# Patient Record
Sex: MALE | Race: BLACK OR AFRICAN AMERICAN | NOT HISPANIC OR LATINO | Employment: FULL TIME | URBAN - METROPOLITAN AREA
[De-identification: names, ages, dates, MRNs, and addresses within clinical notes are randomized per-mention and may not be internally consistent; named-entity substitution may affect disease eponyms.]

---

## 2017-05-24 ENCOUNTER — COMMUNICATION - HEALTHEAST (OUTPATIENT)
Dept: TELEHEALTH | Facility: CLINIC | Age: 32
End: 2017-05-24

## 2017-05-24 ENCOUNTER — COMMUNICATION - HEALTHEAST (OUTPATIENT)
Dept: FAMILY MEDICINE | Facility: CLINIC | Age: 32
End: 2017-05-24

## 2017-05-24 ENCOUNTER — OFFICE VISIT - HEALTHEAST (OUTPATIENT)
Dept: FAMILY MEDICINE | Facility: CLINIC | Age: 32
End: 2017-05-24

## 2017-05-24 DIAGNOSIS — E66.811 OBESITY (BMI 30.0-34.9): ICD-10-CM

## 2017-05-24 DIAGNOSIS — R73.9 HYPERGLYCEMIA: ICD-10-CM

## 2017-05-24 DIAGNOSIS — Z00.00 ROUTINE GENERAL MEDICAL EXAMINATION AT A HEALTH CARE FACILITY: ICD-10-CM

## 2017-05-24 LAB
CHOLEST SERPL-MCNC: 143 MG/DL
FASTING STATUS PATIENT QL REPORTED: YES
HBA1C MFR BLD: 5.8 % (ref 3.5–6)
HDLC SERPL-MCNC: 48 MG/DL
LDLC SERPL CALC-MCNC: 79 MG/DL
TRIGL SERPL-MCNC: 81 MG/DL

## 2017-05-24 ASSESSMENT — MIFFLIN-ST. JEOR: SCORE: 1943.24

## 2018-04-09 ENCOUNTER — OFFICE VISIT - HEALTHEAST (OUTPATIENT)
Dept: FAMILY MEDICINE | Facility: CLINIC | Age: 33
End: 2018-04-09

## 2018-04-09 DIAGNOSIS — R50.9 FEVER: ICD-10-CM

## 2018-04-09 DIAGNOSIS — J02.9 SORETHROAT: ICD-10-CM

## 2018-04-09 LAB
DEPRECATED S PYO AG THROAT QL EIA: NORMAL
FLUAV AG SPEC QL IA: NORMAL
FLUBV AG SPEC QL IA: NORMAL

## 2018-04-09 ASSESSMENT — MIFFLIN-ST. JEOR: SCORE: 1934.17

## 2018-04-10 LAB — GROUP A STREP BY PCR: NORMAL

## 2018-11-26 ENCOUNTER — OFFICE VISIT - HEALTHEAST (OUTPATIENT)
Dept: FAMILY MEDICINE | Facility: CLINIC | Age: 33
End: 2018-11-26

## 2018-11-26 DIAGNOSIS — E66.01 MORBID OBESITY (H): ICD-10-CM

## 2018-11-26 DIAGNOSIS — Z00.00 ROUTINE GENERAL MEDICAL EXAMINATION AT A HEALTH CARE FACILITY: ICD-10-CM

## 2018-11-26 DIAGNOSIS — R73.03 PREDIABETES: ICD-10-CM

## 2018-11-26 LAB
CHOLEST SERPL-MCNC: 147 MG/DL
FASTING STATUS PATIENT QL REPORTED: YES
HBA1C MFR BLD: 5.8 % (ref 3.5–6.1)
HDLC SERPL-MCNC: 54 MG/DL
LDLC SERPL CALC-MCNC: 78 MG/DL
TRIGL SERPL-MCNC: 74 MG/DL

## 2018-11-26 ASSESSMENT — MIFFLIN-ST. JEOR: SCORE: 1961.38

## 2019-12-10 ENCOUNTER — COMMUNICATION - HEALTHEAST (OUTPATIENT)
Dept: SCHEDULING | Facility: CLINIC | Age: 34
End: 2019-12-10

## 2019-12-18 ENCOUNTER — OFFICE VISIT - HEALTHEAST (OUTPATIENT)
Dept: FAMILY MEDICINE | Facility: CLINIC | Age: 34
End: 2019-12-18

## 2019-12-18 DIAGNOSIS — Z00.00 ROUTINE GENERAL MEDICAL EXAMINATION AT A HEALTH CARE FACILITY: ICD-10-CM

## 2019-12-18 DIAGNOSIS — E66.01 MORBID OBESITY (H): ICD-10-CM

## 2019-12-18 LAB
CHOLEST SERPL-MCNC: 142 MG/DL
FASTING STATUS PATIENT QL REPORTED: YES
HBA1C MFR BLD: 5.5 % (ref 3.5–6)
HDLC SERPL-MCNC: 51 MG/DL
LDLC SERPL CALC-MCNC: 81 MG/DL
TRIGL SERPL-MCNC: 51 MG/DL

## 2019-12-18 ASSESSMENT — MIFFLIN-ST. JEOR: SCORE: 1906.95

## 2021-05-31 VITALS — HEIGHT: 68 IN | WEIGHT: 229 LBS | BODY MASS INDEX: 34.71 KG/M2

## 2021-06-01 VITALS — WEIGHT: 227 LBS | HEIGHT: 68 IN | BODY MASS INDEX: 34.4 KG/M2

## 2021-06-02 VITALS — HEIGHT: 68 IN | BODY MASS INDEX: 35.31 KG/M2 | WEIGHT: 233 LBS

## 2021-06-04 VITALS
BODY MASS INDEX: 33.49 KG/M2 | DIASTOLIC BLOOD PRESSURE: 62 MMHG | HEART RATE: 60 BPM | WEIGHT: 221 LBS | HEIGHT: 68 IN | SYSTOLIC BLOOD PRESSURE: 110 MMHG

## 2021-06-04 NOTE — TELEPHONE ENCOUNTER
Who is calling:  Patient   Reason for Call:  Patient states he will be running about 20 min late for appointment. He's wondering whether or not he can still be seen or if it would be easiest to reschedule. Please call as soon as possible.    Okay to leave a detailed message: Yes

## 2021-06-04 NOTE — PROGRESS NOTES
MALE PREVENTATIVE EXAM    Assessment and Plan:     Routine general medical examination at a health care facility  -     Lipid Cascade  -     Glycosylated Hemoglobin A1c  We discussed healthy lifestyle, nutrition, cardiovascular risk reduction, self care, safety, sunscreen, seatbelt, and timing of cancer screening.  Health maintenance screening and immunizations reviewed with the patient.    BMI 33.0-33.9,adult  Counseled healthy lifestyle modifications     Other orders  -     Influenza,Seasonal,Quad,INJ =/>6months    Next follow up:  Return in about 1 year (around 12/18/2020).    Immunization Review  Adult Imm Review: Due today, orders placed    I discussed the following with the patient:   Adult Healthy Living: Importance of regular exercise  Healthy nutrition  Getting adequate sleep  Stress management     Subjective:   Chief Complaint: Audra Starr is an 34 y.o. male here for a preventative health visit.     HPI:    He has been exercising regularly.     Health Maintenance: He is fasting today. He is due for an influenza vaccination today.     Review of Systems: Please see above. The rest of the review of systems are negative for all systems.     PSFH:  He is from [facility]. He was back in September to visit family and for his mom's one year remembrance. He has two girls.     Healthy Habits  Are you taking a daily aspirin? No  Do you typically exercising at least 40 min, 3-4 times per week?  Yes  Do you usually eat at least 4 servings of fruit and vegetables a day, include whole grains and fiber and avoid regularly eating high fat foods? NO  Have you had an eye exam in the past two years? NO  Do you see a dentist twice per year? Yes  Do you have any concerns regarding sleep? No    Safety Screen  If you own firearms, are they secured in a locked gun cabinet or with trigger locks? The patient does not own any firearms  No data recorded    Cancer Screening     Patient has no health maintenance due at this time     "    Patient Care Team:  Dino Polo MD as PCP - General (Family Medicine)  Dino Polo MD as Assigned PCP    History     Reviewed By Date/Time Sections Reviewed    Norah BonillaBhumi 12/18/2019  9:08 AM Socioeconomic, Lifestyle    Chad Bhumi Almazan 12/18/2019  9:07 AM Tobacco, Alcohol, Drug Use, Family    Chad Bhumi Almazan 12/18/2019  9:06 AM Medical, Surgical        Objective:   Vital Signs:   Visit Vitals  /62 (Patient Site: Left Arm, Patient Position: Sitting, Cuff Size: Adult Regular)   Pulse 60   Ht 5' 8\" (1.727 m)   Wt 221 lb (100.2 kg)   BMI 33.60 kg/m         PHYSICAL EXAM  Constitutional: Patient is oriented to person, place, and time. Patient appears well-developed and well-nourished. No distress.   Head: Normocephalic and atraumatic.   Right Ear: External ear normal.   Left Ear: External ear normal.   Nose: Nose normal.   Mouth/Throat: Oropharynx is clear and moist. No oropharyngeal exudate.   Eyes: Conjunctivae and EOM are normal. Pupils are equal, round, and reactive to light. Right eye exhibits no discharge. Left eye exhibits no discharge. No scleral icterus.   Neck: Neck supple. No JVD present. No tracheal deviation present. No thyromegaly present.   Cardiovascular: Normal rate, regular rhythm, normal heart sounds and intact distal pulses.   No murmur heard.   Pulmonary/Chest: Effort normal and breath sounds normal. No stridor. No respiratory distress. Patient has no wheezes, no rales, exhibits no tenderness.   Abdominal: Soft. Bowel sounds are normal. Patient exhibits no distension and no mass.  There is no tenderness. There is no rebound and no guarding.  No inguinal hernia on valsalva maneuver  Lymphadenopathy:  Patient has no cervical adenopathy.   Neurological: Patient is alert and oriented to person, place, and time. Patient has normal reflexes. No cranial nerve deficit. Coordination normal.   Skin: Skin is warm and dry. No rash " noted. Patient is not diaphoretic. No erythema. No pallor.   Psychiatric: Patient has good eye contact without any psychomotor retardation or stereotypic behaviors.  normal mood and affect. Judgment and thought content normal.   Speech is regular rate and rhythm.     ADDITIONAL HISTORY SUMMARIZED (2): None.  DECISION TO OBTAIN EXTRA INFORMATION (1): None.   RADIOLOGY TESTS (1): None.  LABS (1): Reviewed labs from 11/26/18 and ordered labs today.   MEDICINE TESTS (1): None.  INDEPENDENT REVIEW (2 each): None.     The visit lasted a total of 7 minutes face to face with the patient. Over 50% of the time was spent counseling and educating the patient about overall wellness.    INorah, am scribing for and in the presence of, Dr. Holland.    I, Dr. Holland, personally performed the services described in this documentation, as scribed by Norah Bonilla in my presence, and it is both accurate and complete.    Total data points: 1       Medication List      as of December 18, 2019  9:34 AM     You have not been prescribed any medications.         Additional Screenings Completed Today:

## 2021-06-10 NOTE — PROGRESS NOTES
Hm   No issues  hosp surg neg  Fmh: none known  Med neg  Allergies neg  hab neg 1-2 per month.    .  Has one year old.   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  Tries to exercise once in a while.    Hx ? Fasting glucose 114 and did not follow up with A1c    Has green card.  From [facility].  Likely first mmr in [place].    ROS:  Constitutional: denies fever  Vision: denies change in vision  ENT: denies cough  Resp: denies shortness of breath  Card: denies palpitations  GI: denies vomiting or abnormal stool, melena, hematochezia  : denies dysuria  Neuro: denies numbness or weakness  Derm: denies rash  Joints: denies redness or swelling  Endo: denies polyuria  Mental health: mood is good  Extremities: no edema  Otherwise negative review of systems     ROS: as noted above    OBJECTIVE:   Vitals:    05/24/17 0758   BP: 104/70   Pulse: 60   Resp: 16   Temp: 98.6  F (37  C)      Head: atraumatic   Eyes: nl eom, anicteric   Ears: nl external ears   Neck: nl nodes, supple   Lungs: clear to ausc   Heart: regular rhythm  Back: no tenderness  Abd: soft nontender   Joints: uninflamed   Ext: nontender calves   Mental: euthymic  Neuro: no weakness  Gait: normal  bmi 34      ASSESSMENT/PLAN:   Health Maintenance/ Plan: anticipatory guidance, discussion of risk factors, lifestyle modification, and risk factor management. For children age 12 months to adulthood verbal dental referral is given and discussed benefits and risks of FVA and obtained verbal with each application.      Additional diagnoses and related orders:  1. Obesity (BMI 30.0-34.9)  Comprehensive Metabolic Panel   2. Hyperglycemia  Glycosylated Hemoglobin A1c   3. Routine general medical examination at a health care facility  Lipid Cascade     Life style modification for obesity and diabetes mellitus avoidance discussed as priority  follow up yearly or per labs.

## 2021-06-16 PROBLEM — E66.01 MORBID OBESITY (H): Status: ACTIVE | Noted: 2019-12-18

## 2021-06-17 NOTE — PROGRESS NOTES
Assessment/Plan:     1. Sorethroat  2. Fever  Most likely viral in nature.  We reviewed symptomatic treatments with over-the-counter analgesics.  If fever persist beyond the next 2-3 days, symptoms worsen, or onset of additional concerns, they will notify us for further evaluation.  - Rapid Strep A Screen-Throat  - Group A Strep, RNA Direct Detection, Throat  - Influenza A/B Rapid Test        Subjective:      Audra Starr is a 32 y.o. male presenting to clinic today for evaluation of fever and sore throat, sudden onset and acute 2 days ago, worsening.  Fever as high as 102, last fever yesterday evening at 100 , treating with Tylenol or ibuprofen with some improvement.  Headache occurs when fevers present, disappears when fever resolves.  Denies body aches, cough, nasal congestion, nausea, vomiting, diarrhea, or rashes.  He is , 23-month-old daughter at home, both are healthy.  No known exposures.    No current outpatient prescriptions on file.     No current facility-administered medications for this visit.        Past Medical History, Family History, and Social History reviewed.  No past medical history on file.  No past surgical history on file.  Review of patient's allergies indicates no known allergies.  No family history on file.  Social History     Social History     Marital status:      Spouse name: N/A     Number of children: N/A     Years of education: N/A     Occupational History     Not on file.     Social History Main Topics     Smoking status: Never Smoker     Smokeless tobacco: Never Used     Alcohol use Not on file     Drug use: Not on file     Sexual activity: Not on file     Other Topics Concern     Not on file     Social History Narrative         Review of systems is as stated in HPI, and the remainder of the 10 system review is otherwise negative.    Objective:     Vitals:    04/09/18 0730   BP: 110/74   Patient Site: Left Arm   Patient Position: Sitting   Cuff Size: Adult  "Large   Pulse: (!) 102   Resp: 20   Temp: 98.1  F (36.7  C)   TempSrc: Oral   SpO2: 98%   Weight: (!) 227 lb (103 kg)   Height: 5' 8\" (1.727 m)    Body mass index is 34.52 kg/(m^2).    Alert male.  Pupils are equal, round, reactive to light.  Tympanic membrane spelling translucent.  Oropharynx is with erythematous and edematous tonsils and mild erythema as well as this posterior soft palate.  Neck supple with shotty anterior cervical lymphadenopathy.  Heart with regular rate and rhythm, mild tachycardia.  Lungs clear.  Extremities without edema or rashes.    Office Visit on 04/09/2018   Component Date Value Ref Range Status     Rapid Strep A Antigen 04/09/2018 No Group A Strep detected, presumptive negative  No Group A Strep detected, presumptive negative Final     Influenza  A, Rapid Antigen 04/09/2018 No Influenza A antigen detected  No Influenza A antigen detected Final     Influenza B, Rapid Antigen 04/09/2018 No Influenza B antigen detected  No Influenza B antigen detected Final          This note has been dictated using voice recognition software. Any grammatical or context distortions are unintentional and inherent to the the software.   "

## 2021-06-21 NOTE — PROGRESS NOTES
MALE PREVENTATIVE EXAM    Assessment and Plan:     Routine general medical examination at a health care facility  -     Lipid Cascade    Morbid obesity (H)  Counseled on lifestyle modifications    Prediabetes  -     Glycosylated Hemoglobin A1c  Counseled on reduction of carbohydrate and sweet food    Other orders  -     Influenza, Seasonal,Quad Inj, 36+ MOS      Next follow up:  One year    Immunization Review  Adult Imm Review: No immunizations due today  Pt will recieve flu shot today    I discussed the following with the patient:   Adult Healthy Living: Importance of regular exercise  Healthy nutrition  Weight loss referral options  Stress management    Subjective:   Chief Complaint: Audra Starr is an 33 y.o. male accompanied by his wife here for a preventative health visit.     HPI:   Patient is not feeling down or depressed currently.  PHQ-2 Total Score: 0 (11/26/2018  3:00 PM)    Healthy Habits  Are you taking a daily aspirin? No  Do you typically exercising at least 40 min, 3-4 times per week?  NO  Do you usually eat at least 4 servings of fruit and vegetables a day, include whole grains and fiber and avoid regularly eating high fat foods? Yes  Have you had an eye exam in the past two years? NO  Do you see a dentist twice per year? NO  Do you have any concerns regarding sleep? No    Safety Screen  If you own firearms, are they secured in a locked gun cabinet or with trigger locks? The patient does not own any firearms  No Data Recorded    Review of Systems:  Please see above.  The rest of the review of systems are negative for all systems.     Cancer Screening     Patient has no health maintenance due at this time        Patient Care Team:  No Primary Care Provider as PCP - General    History     Reviewed By Date/Time Sections Reviewed    Lise Jimenez CMA 11/26/2018  3:36 PM Tobacco        Objective:   Vital Signs:   Visit Vitals  /68 (Patient Site: Left Arm, Patient Position: Sitting, Cuff  "Size: Adult Regular)   Pulse 72   Ht 5' 8\" (1.727 m)   Wt (!) 233 lb (105.7 kg)   BMI 35.43 kg/m         PHYSICAL EXAM    Objective:    Physical Exam   Vitals:    11/26/18 1536   BP: 112/68   Pulse: 72      Constitutional: Patient is oriented to person, place, and time. Patient appears well-developed and well-nourished. No distress.   Head: Normocephalic and atraumatic.   Right Ear: External ear normal. Normal TM  Left Ear: External ear normal. Normal TM   Nose: Nose normal.   Mouth/Throat: Oropharynx is clear and moist. No oropharyngeal exudate.   Eyes: Conjunctivae and EOM are normal. Pupils are equal, round, and reactive to light. Right eye exhibits no discharge. Left eye exhibits no discharge. No scleral icterus.   Neck: Neck supple. No JVD present. No tracheal deviation present. No thyromegaly present.   Cardiovascular: Normal rate, regular rhythm, normal heart sounds and intact distal pulses. No murmur heard.   Pulmonary/Chest: Effort normal and breath sounds normal. No stridor. No respiratory distress. Patient has no wheezes, no rales, exhibits no tenderness.   Abdominal: Soft. Bowel sounds are normal. Patient exhibits no distension and no mass. There is no tenderness. There is no rebound and no guarding.   Lymphadenopathy:  Patient has no cervical adenopathy.   Neurological: Patient is alert and oriented to person, place, and time. Patient has normal reflexes. No cranial nerve deficit. Coordination normal.   Skin: Skin is warm and dry. No rash noted. Patient is not diaphoretic. No erythema. No pallor.        Medication List      as of 11/26/2018  4:43 PM     You have not been prescribed any medications.         Additional Screenings Completed Today:     ADDITIONAL HISTORY SUMMARIZED (2): None.   DECISION TO OBTAIN EXTRA INFORMATION (1): None.   RADIOLOGY TESTS (1): None.  LABS (1): Labs reviewed from 5/24/2017. Labs ordered today.  MEDICINE TESTS (1): None.  INDEPENDENT REVIEW (2 each): None.     Total data " points = 1    Total time was 18 minutes, greater than 50% counseling and coordinating care regarding the above issues.    By signing my name below, I, Tess Dickson, attest that this documentation has been prepared under the direction and in the presence of Dr. Lorraine Holland.  Electronic Signature: Bhumi Pittman. 11/26/2018 15:55.    I, Dr. Dino Ross MD , personally performed the services described in this documentation. All medical record entries made by the scribe were at my direction and in my presence. I have reviewed the chart and discharge instructions (if applicable) and agree that the record reflects my personal performance and is accurate and complete.

## 2021-06-26 ENCOUNTER — HEALTH MAINTENANCE LETTER (OUTPATIENT)
Age: 36
End: 2021-06-26

## 2021-08-26 ENCOUNTER — OFFICE VISIT (OUTPATIENT)
Dept: FAMILY MEDICINE | Facility: CLINIC | Age: 36
End: 2021-08-26
Payer: COMMERCIAL

## 2021-08-26 VITALS
WEIGHT: 237.2 LBS | HEART RATE: 80 BPM | HEIGHT: 68 IN | SYSTOLIC BLOOD PRESSURE: 108 MMHG | DIASTOLIC BLOOD PRESSURE: 82 MMHG | BODY MASS INDEX: 35.95 KG/M2

## 2021-08-26 DIAGNOSIS — Z00.00 ROUTINE ADULT HEALTH MAINTENANCE: Primary | ICD-10-CM

## 2021-08-26 PROBLEM — E66.01 MORBID OBESITY (H): Status: RESOLVED | Noted: 2019-12-18 | Resolved: 2021-08-26

## 2021-08-26 LAB
CHOLEST SERPL-MCNC: 149 MG/DL
FASTING STATUS PATIENT QL REPORTED: YES
HBA1C MFR BLD: 5.7 % (ref 0–5.6)
HDLC SERPL-MCNC: 47 MG/DL
LDLC SERPL CALC-MCNC: 82 MG/DL
TRIGL SERPL-MCNC: 99 MG/DL

## 2021-08-26 PROCEDURE — 80061 LIPID PANEL: CPT | Performed by: FAMILY MEDICINE

## 2021-08-26 PROCEDURE — 99395 PREV VISIT EST AGE 18-39: CPT | Performed by: FAMILY MEDICINE

## 2021-08-26 PROCEDURE — 83036 HEMOGLOBIN GLYCOSYLATED A1C: CPT | Performed by: FAMILY MEDICINE

## 2021-08-26 PROCEDURE — 36415 COLL VENOUS BLD VENIPUNCTURE: CPT | Performed by: FAMILY MEDICINE

## 2021-08-26 ASSESSMENT — MIFFLIN-ST. JEOR: SCORE: 1982.18

## 2021-08-26 NOTE — PROGRESS NOTES
SUBJECTIVE:   CC: Audra Starr is an 35 year old male who presents for preventative health visit.       Patient has been advised of split billing requirements and indicates understanding: Yes  Healthy Habits:     Getting at least 3 servings of Calcium per day:  NO    Bi-annual eye exam:  NO    Dental care twice a year:  Yes    Sleep apnea or symptoms of sleep apnea:  None    Diet:  Regular (no restrictions)    Frequency of exercise:  1 day/week    Duration of exercise:  30-45 minutes    Taking medications regularly:  Yes    Medication side effects:  None    PHQ-2 Total Score: 0    Additional concerns today:  No      Today's PHQ-2 Score:   PHQ-2 ( 1999 [employer]) 8/26/2021   Q1: Little interest or pleasure in doing things 0   Q2: Feeling down, depressed or hopeless 0   PHQ-2 Score 0   Q1: Little interest or pleasure in doing things Not at all   Q2: Feeling down, depressed or hopeless Not at all   PHQ-2 Score 0       Abuse: Current or Past(Physical, Sexual or Emotional)- No  Do you feel safe in your environment? No    Social History     Tobacco Use     Smoking status: Never Smoker     Smokeless tobacco: Never Used   Substance Use Topics     Alcohol use: Yes         Alcohol Use 8/26/2021   Prescreen: >3 drinks/day or >7 drinks/week? No       Last PSA: No results found for: PSA    Reviewed orders with patient. Reviewed health maintenance and updated orders accordingly - Yes  Lab work is in process    Reviewed and updated as needed this visit by clinical staff  Tobacco  Allergies  Meds  Problems             Reviewed and updated as needed this visit by Provider    Meds  Problems            No past medical history on file.   No past surgical history on file.    Review of Systems  CONSTITUTIONAL: NEGATIVE for fever, chills, change in weight  INTEGUMENTARY/SKIN: NEGATIVE for worrisome rashes, moles or lesions  EYES: NEGATIVE for vision changes or irritation  ENT: NEGATIVE for ear, mouth and throat problems  RESP:  "NEGATIVE for significant cough or SOB  CV: NEGATIVE for chest pain, palpitations or peripheral edema  GI: NEGATIVE for nausea, abdominal pain, heartburn, or change in bowel habits   male: negative for dysuria, hematuria, decreased urinary stream, erectile dysfunction, urethral discharge  MUSCULOSKELETAL: NEGATIVE for significant arthralgias or myalgia  NEURO: NEGATIVE for weakness, dizziness or paresthesias  PSYCHIATRIC: NEGATIVE for changes in mood or affect    OBJECTIVE:   /82 (BP Location: Left arm, Patient Position: Sitting, Cuff Size: Adult Large)   Pulse 80   Ht 1.73 m (5' 8.11\")   Wt 107.6 kg (237 lb 3.2 oz)   BMI 35.95 kg/m      Physical Exam  GENERAL: healthy, alert and no distress  EYES: Eyes grossly normal to inspection, PERRL and conjunctivae and sclerae normal  HENT: ear canals and TM's normal, nose and mouth without ulcers or lesions  NECK: no adenopathy, no asymmetry, masses, or scars and thyroid normal to palpation  RESP: lungs clear to auscultation - no rales, rhonchi or wheezes  CV: regular rate and rhythm, normal S1 S2, no S3 or S4, no murmur, click or rub, no peripheral edema and peripheral pulses strong  ABDOMEN: soft, nontender, no hepatosplenomegaly, no masses and bowel sounds normal  MS: no gross musculoskeletal defects noted, no edema  SKIN: no suspicious lesions or rashes  NEURO: Normal strength and tone, mentation intact and speech normal  PSYCH: mentation appears normal, affect normal/bright    ASSESSMENT/PLAN:   Audra was seen today for physical.    Diagnoses and all orders for this visit:    Routine adult health maintenance  -     Hemoglobin A1c; Future  -     Lipid panel reflex to direct LDL Fasting; Future  We discussed healthy lifestyle, nutrition, cardiovascular risk reduction, self care, safety, sunscreen, and timing of cancer screening.  Health maintenance screening and immunizations reviewed with the patient.  Follow up yearly for the annual physical.     BMI " "35.0-35.9,adult  Counseled healthy lifestyle modifications      Patient has been advised of split billing requirements and indicates understanding: Yes  COUNSELING:   Reviewed preventive health counseling, as reflected in patient instructions    Estimated body mass index is 35.95 kg/m  as calculated from the following:    Height as of this encounter: 1.73 m (5' 8.11\").    Weight as of this encounter: 107.6 kg (237 lb 3.2 oz).     Weight management plan: Discussed healthy diet and exercise guidelines    He reports that he has never smoked. He has never used smokeless tobacco.      Counseling Resources:  ATP IV Guidelines  Pooled Cohorts Equation Calculator  FRAX Risk Assessment  ICSI Preventive Guidelines  Dietary Guidelines for Americans, 2010  USDA's MyPlate  ASA Prophylaxis  Lung CA Screening    Dino Ross MD  [facility]  "

## 2021-10-16 ENCOUNTER — HEALTH MAINTENANCE LETTER (OUTPATIENT)
Age: 36
End: 2021-10-16

## 2022-10-01 ENCOUNTER — HEALTH MAINTENANCE LETTER (OUTPATIENT)
Age: 37
End: 2022-10-01

## 2023-02-09 ASSESSMENT — ENCOUNTER SYMPTOMS
FEVER: 0
PARESTHESIAS: 0
WEAKNESS: 0
SHORTNESS OF BREATH: 0
ARTHRALGIAS: 0
DYSURIA: 0
PALPITATIONS: 0
HEMATOCHEZIA: 0
NAUSEA: 0
DIARRHEA: 0
MYALGIAS: 0
SORE THROAT: 0
EYE PAIN: 0
JOINT SWELLING: 0
ABDOMINAL PAIN: 0
CHILLS: 0
DIZZINESS: 0
HEADACHES: 0
CONSTIPATION: 0
FREQUENCY: 0
COUGH: 0
HEMATURIA: 0
NERVOUS/ANXIOUS: 0
HEARTBURN: 0

## 2023-02-10 ENCOUNTER — OFFICE VISIT (OUTPATIENT)
Dept: FAMILY MEDICINE | Facility: CLINIC | Age: 38
End: 2023-02-10
Payer: COMMERCIAL

## 2023-02-10 VITALS
HEIGHT: 68 IN | OXYGEN SATURATION: 98 % | DIASTOLIC BLOOD PRESSURE: 80 MMHG | TEMPERATURE: 97.5 F | SYSTOLIC BLOOD PRESSURE: 120 MMHG | BODY MASS INDEX: 37.8 KG/M2 | HEART RATE: 79 BPM | WEIGHT: 249.38 LBS

## 2023-02-10 DIAGNOSIS — R73.03 PREDIABETES: Primary | ICD-10-CM

## 2023-02-10 DIAGNOSIS — E66.09 CLASS 2 OBESITY DUE TO EXCESS CALORIES WITHOUT SERIOUS COMORBIDITY WITH BODY MASS INDEX (BMI) OF 37.0 TO 37.9 IN ADULT: ICD-10-CM

## 2023-02-10 DIAGNOSIS — E66.812 CLASS 2 OBESITY DUE TO EXCESS CALORIES WITHOUT SERIOUS COMORBIDITY WITH BODY MASS INDEX (BMI) OF 37.0 TO 37.9 IN ADULT: ICD-10-CM

## 2023-02-10 DIAGNOSIS — Z00.00 ANNUAL PHYSICAL EXAM: ICD-10-CM

## 2023-02-10 DIAGNOSIS — Z11.4 ENCOUNTER FOR SCREENING FOR HIV: ICD-10-CM

## 2023-02-10 DIAGNOSIS — Z11.59 ENCOUNTER FOR HEPATITIS C SCREENING TEST FOR LOW RISK PATIENT: ICD-10-CM

## 2023-02-10 LAB
ALBUMIN SERPL BCG-MCNC: 4.2 G/DL (ref 3.5–5.2)
ALP SERPL-CCNC: 65 U/L (ref 40–129)
ALT SERPL W P-5'-P-CCNC: 76 U/L (ref 10–50)
ANION GAP SERPL CALCULATED.3IONS-SCNC: 12 MMOL/L (ref 7–15)
AST SERPL W P-5'-P-CCNC: 55 U/L (ref 10–50)
BILIRUB SERPL-MCNC: 0.5 MG/DL
BUN SERPL-MCNC: 6.4 MG/DL (ref 6–20)
CALCIUM SERPL-MCNC: 9.7 MG/DL (ref 8.6–10)
CHLORIDE SERPL-SCNC: 103 MMOL/L (ref 98–107)
CHOLEST SERPL-MCNC: 150 MG/DL
CREAT SERPL-MCNC: 0.99 MG/DL (ref 0.67–1.17)
DEPRECATED HCO3 PLAS-SCNC: 25 MMOL/L (ref 22–29)
ERYTHROCYTE [DISTWIDTH] IN BLOOD BY AUTOMATED COUNT: 13.1 % (ref 10–15)
GFR SERPL CREATININE-BSD FRML MDRD: >90 ML/MIN/1.73M2
GLUCOSE SERPL-MCNC: 92 MG/DL (ref 70–99)
HBA1C MFR BLD: 6.1 % (ref 0–5.6)
HCT VFR BLD AUTO: 41.5 % (ref 40–53)
HDLC SERPL-MCNC: 46 MG/DL
HGB BLD-MCNC: 14.3 G/DL (ref 13.3–17.7)
LDLC SERPL CALC-MCNC: 82 MG/DL
MCH RBC QN AUTO: 28.7 PG (ref 26.5–33)
MCHC RBC AUTO-ENTMCNC: 34.5 G/DL (ref 31.5–36.5)
MCV RBC AUTO: 83 FL (ref 78–100)
NONHDLC SERPL-MCNC: 104 MG/DL
PLATELET # BLD AUTO: 234 10E3/UL (ref 150–450)
POTASSIUM SERPL-SCNC: 4.1 MMOL/L (ref 3.4–5.3)
PROT SERPL-MCNC: 7.8 G/DL (ref 6.4–8.3)
RBC # BLD AUTO: 4.99 10E6/UL (ref 4.4–5.9)
SODIUM SERPL-SCNC: 140 MMOL/L (ref 136–145)
TRIGL SERPL-MCNC: 108 MG/DL
WBC # BLD AUTO: 5.3 10E3/UL (ref 4–11)

## 2023-02-10 PROCEDURE — 85027 COMPLETE CBC AUTOMATED: CPT | Performed by: STUDENT IN AN ORGANIZED HEALTH CARE EDUCATION/TRAINING PROGRAM

## 2023-02-10 PROCEDURE — 83036 HEMOGLOBIN GLYCOSYLATED A1C: CPT | Performed by: STUDENT IN AN ORGANIZED HEALTH CARE EDUCATION/TRAINING PROGRAM

## 2023-02-10 PROCEDURE — 80061 LIPID PANEL: CPT | Performed by: STUDENT IN AN ORGANIZED HEALTH CARE EDUCATION/TRAINING PROGRAM

## 2023-02-10 PROCEDURE — 99213 OFFICE O/P EST LOW 20 MIN: CPT | Mod: 25 | Performed by: STUDENT IN AN ORGANIZED HEALTH CARE EDUCATION/TRAINING PROGRAM

## 2023-02-10 PROCEDURE — 36415 COLL VENOUS BLD VENIPUNCTURE: CPT | Performed by: STUDENT IN AN ORGANIZED HEALTH CARE EDUCATION/TRAINING PROGRAM

## 2023-02-10 PROCEDURE — 80053 COMPREHEN METABOLIC PANEL: CPT | Performed by: STUDENT IN AN ORGANIZED HEALTH CARE EDUCATION/TRAINING PROGRAM

## 2023-02-10 PROCEDURE — 91312 COVID-19 VACCINE BIVALENT BOOSTER 12+ (PFIZER): CPT | Performed by: STUDENT IN AN ORGANIZED HEALTH CARE EDUCATION/TRAINING PROGRAM

## 2023-02-10 PROCEDURE — 87389 HIV-1 AG W/HIV-1&-2 AB AG IA: CPT | Performed by: STUDENT IN AN ORGANIZED HEALTH CARE EDUCATION/TRAINING PROGRAM

## 2023-02-10 PROCEDURE — 86803 HEPATITIS C AB TEST: CPT | Performed by: STUDENT IN AN ORGANIZED HEALTH CARE EDUCATION/TRAINING PROGRAM

## 2023-02-10 PROCEDURE — 0124A COVID-19 VACCINE BIVALENT BOOSTER 12+ (PFIZER): CPT | Performed by: STUDENT IN AN ORGANIZED HEALTH CARE EDUCATION/TRAINING PROGRAM

## 2023-02-10 PROCEDURE — 99395 PREV VISIT EST AGE 18-39: CPT | Performed by: STUDENT IN AN ORGANIZED HEALTH CARE EDUCATION/TRAINING PROGRAM

## 2023-02-10 ASSESSMENT — PAIN SCALES - GENERAL: PAINLEVEL: NO PAIN (0)

## 2023-02-10 NOTE — PROGRESS NOTES
Assessment/ Plan     37-year-old male with unremarkable past medical history who presents for annual physical exam.  Some skin xerosis and pruritus and we discussed behavioral treatments to improve this.    1. Prediabetes  5. Class 2 obesity due to excess calories without serious comorbidity with body mass index (BMI) of 37.0 to 37.9 in adult  Nutrition and exercise counseling performed  - Hemoglobin A1c; Future    2. Annual physical exam  - CBC with platelets; Future  - Comprehensive metabolic panel (BMP + Alb, Alk Phos, ALT, AST, Total. Bili, TP); Future  - Lipid panel reflex to direct LDL Fasting; Future    3. Encounter for hepatitis C screening test for low risk patient  - Hepatitis C antibody; Future    4. Encounter for screening for HIV  - HIV Antigen Antibody Combo; Future    Follow-up in: 1 year for physical    Scott Nicholson MD    Subjective:     Audra Starr is a 37 year old male who presents for an annual exam.     Chief Complaint   Patient presents with     Physical     patient is having some dry skin. Chronic Hx. Pruritis with it. Using Eucerin every day.     Colonoscopy: never  PSA:  No results found for: PSA     Answers for HPI/ROS submitted by the patient on 2/9/2023  Frequency of exercise:: 1 day/week  Getting at least 3 servings of Calcium per day:: Yes  Diet:: Regular (no restrictions)  Taking medications regularly:: Yes  Medication side effects:: Not applicable  Bi-annual eye exam:: NO  Dental care twice a year:: Yes  Sleep apnea or symptoms of sleep apnea:: None  abdominal pain: No  Blood in stool: No  Blood in urine: No  chest pain: No  chills: No  congestion: No  constipation: No  cough: No  diarrhea: No  dizziness: No  ear pain: No  eye pain: No  nervous/anxious: No  fever: No  frequency: No  genital sores: No  headaches: No  hearing loss: No  heartburn: No  arthralgias: No  joint swelling: No  peripheral edema: No  mood changes: No  myalgias: No  nausea: No  dysuria: No  palpitations:  No  Skin sensation changes: No  sore throat: No  urgency: No  rash: No  shortness of breath: No  visual disturbance: No  weakness: No  impotence: No  penile discharge: No  Additional concerns today:: No  Duration of exercise:: 30-45 minutes      Immunization History   Administered Date(s) Administered     Flu, Unspecified 02/20/2014     Influenza Vaccine, 6+MO IM (QUADRIVALENT W/PRESERVATIVES) 01/04/2016, 11/26/2018, 12/18/2019     MMR 02/20/2014     Tdap (Adacel,Boostrix) 02/20/2014     Immunization status: due today.     No current outpatient medications on file.     No past medical history on file.  No past surgical history on file.  Patient has no known allergies.  Family History   Problem Relation Age of Onset     No Known Problems Mother      No Known Problems Father      No Known Problems Maternal Grandmother      No Known Problems Maternal Grandfather      No Known Problems Paternal Grandmother      No Known Problems Paternal Grandfather      Social History     Socioeconomic History     Marital status:      Spouse name: Not on file     Number of children: Not on file     Years of education: Not on file     Highest education level: Not on file   Occupational History     Not on file   Tobacco Use     Smoking status: Never     Smokeless tobacco: Never   Substance and Sexual Activity     Alcohol use: Yes     Drug use: Never     Sexual activity: Not on file   Other Topics Concern     Not on file   Social History Narrative     Not on file     Social Determinants of Health     Financial Resource Strain: Not on file   Food Insecurity: Not on file   Transportation Needs: Not on file   Physical Activity: Not on file   Stress: Not on file   Social Connections: Not on file   Intimate Partner Violence: Not on file   Housing Stability: Not on file       Review of Systems  Complete ROS negative except as noted in the HPI    Objective:      Vitals:    02/10/23 1051   BP: 120/80   Pulse: 79   Temp: 97.5  F (36.4  C)  "  SpO2: 98%   Weight: 113.1 kg (249 lb 6 oz)   Height: 1.727 m (5' 8\")       General appearance: Alert, cooperative, no distress, appears stated age, overweight  Head: Normocephalic, atraumatic, without obvious abnormality  EARS: TM's gray dull with structures seen bilaterally  Eyes: Pupils equal round, reactive.  Conjunctiva clear.  Nose: Nares normal, no drainage.  Throat: Lips, mucosa, tongue normal mucosa pink and moist  Neck: Supple, symmetric, trachea midline, no adenopathy.  No thyroid enlargement, tenderness or nodules.    Lungs: Clear to auscultation bilaterally, no wheezing or crackles present.  Respirations unlabored  Heart: Regular rate and rhythm, normal S1 and S2, no murmur, rub or gallop.  Abdomen: Soft, nontender, nondistended.  Bowel sounds active in all 4 quadrants.  No masses or organomegaly.  Extremities: Extremities normal, atraumatic.  No cyanosis or edema.  Skin: Skin color, texture, turgor normal no rashes or lesions on limited skin exam  Neurologic: CN II through XII intact, normal strength.      Scott Howard MD    "

## 2023-02-11 LAB
HCV AB SERPL QL IA: NONREACTIVE
HIV 1+2 AB+HIV1 P24 AG SERPL QL IA: NONREACTIVE

## 2023-02-13 PROBLEM — R73.03 PREDIABETES: Status: ACTIVE | Noted: 2023-02-13

## 2023-02-13 NOTE — RESULT ENCOUNTER NOTE
I called and spoke with the patient about their recent clinic visit results. I answered any questions they had.      Dr. Scott Nicholson

## 2024-01-11 ENCOUNTER — PATIENT OUTREACH (OUTPATIENT)
Dept: CARE COORDINATION | Facility: CLINIC | Age: 39
End: 2024-01-11
Payer: COMMERCIAL

## 2024-01-25 ENCOUNTER — PATIENT OUTREACH (OUTPATIENT)
Dept: CARE COORDINATION | Facility: CLINIC | Age: 39
End: 2024-01-25
Payer: COMMERCIAL

## 2024-04-25 SDOH — HEALTH STABILITY: PHYSICAL HEALTH: ON AVERAGE, HOW MANY DAYS PER WEEK DO YOU ENGAGE IN MODERATE TO STRENUOUS EXERCISE (LIKE A BRISK WALK)?: 1 DAY

## 2024-04-25 SDOH — HEALTH STABILITY: PHYSICAL HEALTH: ON AVERAGE, HOW MANY MINUTES DO YOU ENGAGE IN EXERCISE AT THIS LEVEL?: 30 MIN

## 2024-04-25 ASSESSMENT — SOCIAL DETERMINANTS OF HEALTH (SDOH): HOW OFTEN DO YOU GET TOGETHER WITH FRIENDS OR RELATIVES?: ONCE A WEEK

## 2024-04-26 ENCOUNTER — OFFICE VISIT (OUTPATIENT)
Dept: FAMILY MEDICINE | Facility: CLINIC | Age: 39
End: 2024-04-26
Payer: COMMERCIAL

## 2024-04-26 VITALS
SYSTOLIC BLOOD PRESSURE: 110 MMHG | TEMPERATURE: 97 F | RESPIRATION RATE: 16 BRPM | BODY MASS INDEX: 36.53 KG/M2 | DIASTOLIC BLOOD PRESSURE: 60 MMHG | OXYGEN SATURATION: 98 % | HEART RATE: 75 BPM | HEIGHT: 68 IN | WEIGHT: 241 LBS

## 2024-04-26 DIAGNOSIS — E66.812 CLASS 2 SEVERE OBESITY DUE TO EXCESS CALORIES WITH SERIOUS COMORBIDITY AND BODY MASS INDEX (BMI) OF 36.0 TO 36.9 IN ADULT (H): ICD-10-CM

## 2024-04-26 DIAGNOSIS — R73.03 PREDIABETES: Primary | ICD-10-CM

## 2024-04-26 DIAGNOSIS — E66.01 CLASS 2 SEVERE OBESITY DUE TO EXCESS CALORIES WITH SERIOUS COMORBIDITY AND BODY MASS INDEX (BMI) OF 36.0 TO 36.9 IN ADULT (H): ICD-10-CM

## 2024-04-26 DIAGNOSIS — Z00.00 ANNUAL PHYSICAL EXAM: ICD-10-CM

## 2024-04-26 DIAGNOSIS — N52.9 ERECTILE DYSFUNCTION, UNSPECIFIED ERECTILE DYSFUNCTION TYPE: ICD-10-CM

## 2024-04-26 PROBLEM — E66.09 CLASS 2 OBESITY DUE TO EXCESS CALORIES WITHOUT SERIOUS COMORBIDITY WITH BODY MASS INDEX (BMI) OF 37.0 TO 37.9 IN ADULT: Status: RESOLVED | Noted: 2019-12-18 | Resolved: 2024-04-26

## 2024-04-26 LAB
ERYTHROCYTE [DISTWIDTH] IN BLOOD BY AUTOMATED COUNT: 13 % (ref 10–15)
HBA1C MFR BLD: 5.9 % (ref 0–5.6)
HCT VFR BLD AUTO: 43.3 % (ref 40–53)
HGB BLD-MCNC: 14.6 G/DL (ref 13.3–17.7)
MCH RBC QN AUTO: 28.2 PG (ref 26.5–33)
MCHC RBC AUTO-ENTMCNC: 33.7 G/DL (ref 31.5–36.5)
MCV RBC AUTO: 84 FL (ref 78–100)
PLATELET # BLD AUTO: 242 10E3/UL (ref 150–450)
RBC # BLD AUTO: 5.17 10E6/UL (ref 4.4–5.9)
WBC # BLD AUTO: 4.8 10E3/UL (ref 4–11)

## 2024-04-26 PROCEDURE — 91320 SARSCV2 VAC 30MCG TRS-SUC IM: CPT | Performed by: STUDENT IN AN ORGANIZED HEALTH CARE EDUCATION/TRAINING PROGRAM

## 2024-04-26 PROCEDURE — 85027 COMPLETE CBC AUTOMATED: CPT | Performed by: STUDENT IN AN ORGANIZED HEALTH CARE EDUCATION/TRAINING PROGRAM

## 2024-04-26 PROCEDURE — 90480 ADMN SARSCOV2 VAC 1/ONLY CMP: CPT | Performed by: STUDENT IN AN ORGANIZED HEALTH CARE EDUCATION/TRAINING PROGRAM

## 2024-04-26 PROCEDURE — 36415 COLL VENOUS BLD VENIPUNCTURE: CPT | Performed by: STUDENT IN AN ORGANIZED HEALTH CARE EDUCATION/TRAINING PROGRAM

## 2024-04-26 PROCEDURE — 80061 LIPID PANEL: CPT | Performed by: STUDENT IN AN ORGANIZED HEALTH CARE EDUCATION/TRAINING PROGRAM

## 2024-04-26 PROCEDURE — 90715 TDAP VACCINE 7 YRS/> IM: CPT | Performed by: STUDENT IN AN ORGANIZED HEALTH CARE EDUCATION/TRAINING PROGRAM

## 2024-04-26 PROCEDURE — 99213 OFFICE O/P EST LOW 20 MIN: CPT | Mod: 25 | Performed by: STUDENT IN AN ORGANIZED HEALTH CARE EDUCATION/TRAINING PROGRAM

## 2024-04-26 PROCEDURE — 90471 IMMUNIZATION ADMIN: CPT | Performed by: STUDENT IN AN ORGANIZED HEALTH CARE EDUCATION/TRAINING PROGRAM

## 2024-04-26 PROCEDURE — 80053 COMPREHEN METABOLIC PANEL: CPT | Performed by: STUDENT IN AN ORGANIZED HEALTH CARE EDUCATION/TRAINING PROGRAM

## 2024-04-26 PROCEDURE — 99395 PREV VISIT EST AGE 18-39: CPT | Mod: 25 | Performed by: STUDENT IN AN ORGANIZED HEALTH CARE EDUCATION/TRAINING PROGRAM

## 2024-04-26 PROCEDURE — 83036 HEMOGLOBIN GLYCOSYLATED A1C: CPT | Performed by: STUDENT IN AN ORGANIZED HEALTH CARE EDUCATION/TRAINING PROGRAM

## 2024-04-26 RX ORDER — SILDENAFIL 50 MG/1
50 TABLET, FILM COATED ORAL DAILY PRN
Qty: 20 TABLET | Refills: 0 | Status: SHIPPED | OUTPATIENT
Start: 2024-04-26

## 2024-04-26 RX ORDER — SILDENAFIL 50 MG/1
50 TABLET, FILM COATED ORAL DAILY PRN
Qty: 20 TABLET | Refills: 0 | Status: SHIPPED | OUTPATIENT
Start: 2024-04-26 | End: 2024-04-26

## 2024-04-26 ASSESSMENT — PAIN SCALES - GENERAL: PAINLEVEL: NO PAIN (0)

## 2024-04-26 NOTE — PROGRESS NOTES
Assessment/ Plan   38-year-old male with past history of morbid obesity, prediabetes who presents for annual physical exam.  Discussed weight again today.  Gave general recommendations.  Did discuss that he would qualify for weight loss medication but he is not interested at this time which is reasonable.  He has been having some erectile dysfunction a couple times recently.  Discussed Viagra and will place an order for this.    1. Prediabetes  - Hemoglobin A1c; Future    2. Annual physical exam  - Comprehensive metabolic panel (BMP + Alb, Alk Phos, ALT, AST, Total. Bili, TP); Future  - CBC with platelets; Future  - Lipid panel reflex to direct LDL Fasting; Future    3. Class 2 severe obesity due to excess calories with serious comorbidity and body mass index (BMI) of 36.0 to 36.9 in adult (H)    4. Erectile dysfunction, unspecified erectile dysfunction type  - sildenafil (VIAGRA) 50 MG tablet; Take 1 tablet (50 mg) by mouth daily as needed (erectile dysfunction)  Dispense: 20 tablet; Refill: 0    Follow-up in: 1 year for physical    Scott Nicholson MD    Subjective:     Audra Starr is a 38 year old male who presents for an annual exam.     Chief Complaint   Patient presents with    Physical       Immunization History   Administered Date(s) Administered    COVID-19 Bivalent 12+ (Pfizer) 02/10/2023    COVID-19 MONOVALENT 12+ (Pfizer) 04/29/2021, 05/22/2021    Flu, Unspecified 02/20/2014    Influenza (IIV3) PF 02/20/2014    Influenza Vaccine, 6+MO IM (QUADRIVALENT W/PRESERVATIVES) 01/04/2016, 11/02/2017, 11/26/2018, 12/18/2019    MMR 02/20/2014    Mantoux Tuberculin Skin Test 02/20/2014    TDAP (Adacel,Boostrix) 02/20/2014    Varicella 02/25/2014     Immunization status: due today.     No current outpatient medications on file.     No past medical history on file.  No past surgical history on file.  Patient has no known allergies.  Family History   Problem Relation Age of Onset    No Known Problems Mother     No  Known Problems Father     No Known Problems Maternal Grandmother     No Known Problems Maternal Grandfather     No Known Problems Paternal Grandmother     No Known Problems Paternal Grandfather      Social History     Socioeconomic History    Marital status:      Spouse name: Not on file    Number of children: Not on file    Years of education: Not on file    Highest education level: Not on file   Occupational History    Not on file   Tobacco Use    Smoking status: Never    Smokeless tobacco: Never   Substance and Sexual Activity    Alcohol use: Yes     Comment: a couple times a year    Drug use: Never    Sexual activity: Yes     Partners: Female     Birth control/protection: I.U.D.   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 [place]     Live in [place]     Social Determinants of Health     Financial Resource Strain: Low Risk  (4/25/2024)    Financial Resource Strain     Within the past 12 months, have you or your family members you live with been unable to get utilities (heat, electricity) when it was really needed?: No   Food Insecurity: Low Risk  (4/25/2024)    Food Insecurity     Within the past 12 months, did you worry that your food would run out before you got money to buy more?: No     Within the past 12 months, did the food you bought just not last and you didn t have money to get more?: No   Transportation Needs: Low Risk  (4/25/2024)    Transportation Needs     Within the past 12 months, has lack of transportation kept you from medical appointments, getting your medicines, non-medical meetings or appointments, work, or from getting things that you need?: No   Physical Activity: Insufficiently Active (4/25/2024)    Exercise Vital Sign     Days of Exercise per Week: 1 day     Minutes of Exercise per Session: 30 min  "  Stress: No Stress Concern Present (4/25/2024)    [employer] - Occupational Stress Questionnaire     Feeling of Stress : Only a little   Social Connections: Unknown (4/25/2024)    Social Connection and Isolation Panel [NHANES]     Frequency of Communication with Friends and Family: Not on file     Frequency of Social Gatherings with Friends and Family: Once a week     Attends [employer] Services: Not on file     Active Member of Clubs or Organizations: Not on file     Attends Club or Organization Meetings: Not on file     Marital Status: Not on file   Interpersonal Safety: Not on file   Housing Stability: Low Risk  (4/25/2024)    Housing Stability     Do you have housing? : Yes     Are you worried about losing your housing?: No         Review of Systems  Complete ROS negative except as noted in the HPI    Objective:      Vitals:    04/26/24 0954   BP: 110/60   Pulse: 75   Resp: 16   Temp: 97  F (36.1  C)   SpO2: 98%   Weight: 109.3 kg (241 lb)   Height: 1.727 m (5' 8\")       Physical Exam:  /60   Pulse 75   Temp 97  F (36.1  C)   Resp 16   Ht 1.727 m (5' 8\")   Wt 109.3 kg (241 lb)   SpO2 98%   BMI 36.64 kg/m      General appearance: Alert, cooperative, no distress, appears stated age, overweight  Head: Normocephalic, atraumatic, without obvious abnormality  EARS: TM's gray dull with structures seen bilaterally  Eyes: Pupils equal round, reactive.  Conjunctiva clear.  Nose: Nares normal, no drainage.  Throat: Lips, mucosa, tongue normal mucosa pink and moist  Neck: Supple, symmetric, trachea midline, no adenopathy.  No thyroid enlargement, tenderness or nodules.    Lungs: Clear to auscultation bilaterally, no wheezing or crackles present.  Respirations unlabored  Heart: Regular rate and rhythm, normal S1 and S2, no murmur, rub or gallop.  Abdomen: Soft, nontender, nondistended.  Bowel sounds active in all 4 quadrants.  No masses or organomegaly.  Extremities: Extremities " normal, atraumatic.  No cyanosis or edema.  Skin: Skin color, texture, turgor normal no rashes or lesions on limited skin exam  Neurologic: CN II through XII intact, normal strength.    No results found for any visits on 04/26/24.    Scott Howard MD

## 2024-04-27 LAB
ALBUMIN SERPL BCG-MCNC: 4.4 G/DL (ref 3.5–5.2)
ALP SERPL-CCNC: 66 U/L (ref 40–150)
ALT SERPL W P-5'-P-CCNC: 52 U/L (ref 0–70)
ANION GAP SERPL CALCULATED.3IONS-SCNC: 8 MMOL/L (ref 7–15)
AST SERPL W P-5'-P-CCNC: 43 U/L (ref 0–45)
BILIRUB SERPL-MCNC: 0.5 MG/DL
BUN SERPL-MCNC: 7.5 MG/DL (ref 6–20)
CALCIUM SERPL-MCNC: 9.5 MG/DL (ref 8.6–10)
CHLORIDE SERPL-SCNC: 105 MMOL/L (ref 98–107)
CHOLEST SERPL-MCNC: 131 MG/DL
CREAT SERPL-MCNC: 1.03 MG/DL (ref 0.67–1.17)
DEPRECATED HCO3 PLAS-SCNC: 27 MMOL/L (ref 22–29)
EGFRCR SERPLBLD CKD-EPI 2021: >90 ML/MIN/1.73M2
FASTING STATUS PATIENT QL REPORTED: YES
GLUCOSE SERPL-MCNC: 89 MG/DL (ref 70–99)
HDLC SERPL-MCNC: 51 MG/DL
LDLC SERPL CALC-MCNC: 67 MG/DL
NONHDLC SERPL-MCNC: 80 MG/DL
POTASSIUM SERPL-SCNC: 4.1 MMOL/L (ref 3.4–5.3)
PROT SERPL-MCNC: 7.9 G/DL (ref 6.4–8.3)
SODIUM SERPL-SCNC: 140 MMOL/L (ref 135–145)
TRIGL SERPL-MCNC: 64 MG/DL

## 2024-04-29 NOTE — RESULT ENCOUNTER NOTE
Audra Starr  Your results from your recent clinic visit show:  Your Hemoglobin A1c has improved some to 5.9 from 6.1  Your cholesterol was normal  Your CMP was normal with normal electrolytes, kidney function, and liver function  Your CBC is normal with no anemia or signs of infection seen    If you have more questions please call the clinic at [phone] or send me a [employer] message    Dr. Scott Nicholson

## 2025-03-27 ENCOUNTER — PATIENT OUTREACH (OUTPATIENT)
Dept: CARE COORDINATION | Facility: CLINIC | Age: 40
End: 2025-03-27
Payer: COMMERCIAL

## 2025-04-10 ENCOUNTER — PATIENT OUTREACH (OUTPATIENT)
Dept: CARE COORDINATION | Facility: CLINIC | Age: 40
End: 2025-04-10
Payer: COMMERCIAL

## 2025-06-08 ENCOUNTER — HEALTH MAINTENANCE LETTER (OUTPATIENT)
Age: 40
End: 2025-06-08